# Patient Record
Sex: MALE | Race: WHITE | NOT HISPANIC OR LATINO | Employment: FULL TIME | URBAN - METROPOLITAN AREA
[De-identification: names, ages, dates, MRNs, and addresses within clinical notes are randomized per-mention and may not be internally consistent; named-entity substitution may affect disease eponyms.]

---

## 2018-11-12 ENCOUNTER — HOSPITAL ENCOUNTER (EMERGENCY)
Facility: HOSPITAL | Age: 38
Discharge: HOME | End: 2018-11-12
Attending: EMERGENCY MEDICINE

## 2018-11-12 VITALS
SYSTOLIC BLOOD PRESSURE: 116 MMHG | RESPIRATION RATE: 18 BRPM | OXYGEN SATURATION: 98 % | DIASTOLIC BLOOD PRESSURE: 86 MMHG | TEMPERATURE: 98 F | HEART RATE: 66 BPM

## 2018-11-12 DIAGNOSIS — F10.920 ALCOHOLIC INTOXICATION WITHOUT COMPLICATION: Primary | ICD-10-CM

## 2018-11-12 LAB — ETHANOL SERPL-MCNC: 261 MG/DL

## 2018-11-12 PROCEDURE — 99282 EMERGENCY DEPT VISIT SF MDM: CPT | Mod: ,,, | Performed by: NURSE PRACTITIONER

## 2018-11-12 PROCEDURE — 80320 DRUG SCREEN QUANTALCOHOLS: CPT

## 2018-11-12 PROCEDURE — 99283 EMERGENCY DEPT VISIT LOW MDM: CPT

## 2018-11-12 NOTE — DISCHARGE INSTRUCTIONS
Our goal in the emergency department is to always give you outstanding care and exceptional service. You may receive a survey by mail or e-mail in the next week regarding your experience in our ED. We would greatly appreciate your completing and returning the survey. Your feedback provides us with a way to recognize our staff who give very good care and it helps us learn how to improve when your experience was below our aspiration of excellence.

## 2018-11-12 NOTE — ED PROVIDER NOTES
Encounter Date: 11/12/2018       History     Chief Complaint   Patient presents with    Alcohol Intoxication     Pt brought in by No EMS. Pt found outside of [facility] stumbling. Pt appears to be intoxicated     Patient is a 37-year-old male with no significant medical history presenting to the ED for acute alcohol intoxication.  As per EMS patient was found stumbling at [facility].  Patient endorses drinking alcohol tonight.  Patient states he got hungry and wanted to get a waffle.    Patient denies any falls or trauma.  Patient able to ambulate with no assistance.          Review of patient's allergies indicates:  No Known Allergies  No past medical history on file.  No past surgical history on file.  No family history on file.  Social History     Tobacco Use    Smoking status: Not on file   Substance Use Topics    Alcohol use: Not on file    Drug use: Not on file     Review of Systems   Constitutional: Negative for chills and fever.   Respiratory: Negative for shortness of breath.    Cardiovascular: Negative for chest pain.   Gastrointestinal: Negative for abdominal pain.   Genitourinary: Negative for dysuria.   Musculoskeletal: Negative for arthralgias, back pain, gait problem and myalgias.   Skin: Negative for rash.   Neurological: Negative for dizziness, syncope, weakness, numbness and headaches.   Hematological: Does not bruise/bleed easily.       Physical Exam     Initial Vitals [11/12/18 0259]   BP Pulse Resp Temp SpO2   116/86 66 18 97.5 °F (36.4 °C) 98 %      MAP       --         Physical Exam    Nursing note and vitals reviewed.  Constitutional: Vital signs are normal. He appears well-developed and well-nourished. He is not diaphoretic. He is cooperative. He does not have a sickly appearance. He does not appear ill. No distress.   HENT:   Head: Normocephalic and atraumatic.   Eyes: Pupils are equal, round, and reactive to light.   Neck: Normal range of motion. Neck supple.   Cardiovascular:  Normal rate, regular rhythm and normal heart sounds.   Pulses:       Radial pulses are 2+ on the right side, and 2+ on the left side.   Pulmonary/Chest: Effort normal and breath sounds normal.   Abdominal: Soft. Normal appearance and bowel sounds are normal. There is no tenderness. There is no rigidity, no rebound and no guarding.   Musculoskeletal: Normal range of motion.   Neurological: He is alert and oriented to person, place, and time. He has normal strength. No sensory deficit. GCS eye subscore is 4. GCS verbal subscore is 5. GCS motor subscore is 6.   Skin: Skin is warm, dry and intact. No abrasion, no laceration and no rash noted. No cyanosis. Nails show no clubbing.   Psychiatric: He has a normal mood and affect. His speech is normal and behavior is normal. Judgment and thought content normal. Cognition and memory are normal.         ED Course   Procedures  Labs Reviewed   ALCOHOL,MEDICAL (ETHANOL) - Abnormal; Notable for the following components:       Result Value    Alcohol, Medical, Serum 261 (*)     All other components within normal limits          Imaging Results    None                APC / Resident Notes:   Emergent evaluation of a 38 yo male patient presenting to the ER with chief complaint of acute alcohol intoxication.  Patient states he was celebrating the same swimming today and drank a few beers.  Patient states he then walked to walk the halls to get something to eat.  While at [facility] he was stumbling so staff called EMS.  Patient denies any complaints on exam.  On exam patient A&O x3. Abdomen soft and nontender. Breath sounds clear bilaterally. Negative Romberg noted. I will get alcohol and reassess.  Advised patient if he can get a ride home he can be discharged.  Patient states he will attempt to call friends.  Differential diagnoses include but are not limited to alcohol / drug use.  I discussed the care of this patient with my Supervising Physician.        Patient is  hemodynamically stable, vital signs are normal. Patient was able to get a friend to come and pick him up.  Patient able to ambulate while in the ED with no complaints. Patient able to tolerate p.o..  Discharge instructions given.  Return to ED precautions discussed. Follow up as directed. Pt verbalized understanding of this plan. Pt is stable for discharge.            Attending Attestation:     Physician Attestation Statement for NP/PA:   I discussed this assessment and plan of this patient with the NP/PA, but I did not personally examine the patient. The face to face encounter was performed by the NP/PA.                     Clinical Impression:   The encounter diagnosis was Alcoholic intoxication without complication.      Disposition:   Disposition: Discharged  Condition: Stable                        Binta Lyles NP  11/12/18 0402       Conrado Gamboa III, MD  11/28/18 1212

## 2018-11-12 NOTE — ED NOTES
Pt's friend here to  patient. Pt able to walk out of ED with a steady gait and was visualized getting into friend's car